# Patient Record
Sex: FEMALE | Race: WHITE | NOT HISPANIC OR LATINO | Employment: FULL TIME | URBAN - METROPOLITAN AREA
[De-identification: names, ages, dates, MRNs, and addresses within clinical notes are randomized per-mention and may not be internally consistent; named-entity substitution may affect disease eponyms.]

---

## 2018-07-03 ENCOUNTER — TELEPHONE (OUTPATIENT)
Dept: GASTROENTEROLOGY | Facility: CLINIC | Age: 53
End: 2018-07-03

## 2018-07-03 NOTE — TELEPHONE ENCOUNTER
----- Message from Kalee Dias sent at 7/3/2018  3:13 PM CDT -----  Contact: Self   Pt is requesting a call back in regards to scheduling an appt for a second opinion for bloating, vomiting and stomach pain. Pt thinks there might be a blockage from radiation treatment and would like to be seen.       Pt can be connate at  [phone]

## 2018-07-06 ENCOUNTER — OFFICE VISIT (OUTPATIENT)
Dept: GASTROENTEROLOGY | Facility: CLINIC | Age: 53
End: 2018-07-06
Payer: COMMERCIAL

## 2018-07-06 VITALS
SYSTOLIC BLOOD PRESSURE: 143 MMHG | HEART RATE: 61 BPM | DIASTOLIC BLOOD PRESSURE: 90 MMHG | HEIGHT: 61 IN | BODY MASS INDEX: 22.6 KG/M2 | WEIGHT: 119.69 LBS

## 2018-07-06 DIAGNOSIS — K58.0 IRRITABLE BOWEL SYNDROME WITH DIARRHEA: ICD-10-CM

## 2018-07-06 DIAGNOSIS — R14.0 BLOATING: Primary | ICD-10-CM

## 2018-07-06 DIAGNOSIS — K56.699 COLONIC STRICTURE: ICD-10-CM

## 2018-07-06 PROCEDURE — 99999 PR PBB SHADOW E&M-EST. PATIENT-LVL III: CPT | Mod: PBBFAC,,, | Performed by: INTERNAL MEDICINE

## 2018-07-06 PROCEDURE — 3008F BODY MASS INDEX DOCD: CPT | Mod: CPTII,S$GLB,, | Performed by: INTERNAL MEDICINE

## 2018-07-06 PROCEDURE — 91065 BREATH HYDROGEN/METHANE TEST: CPT | Mod: S$GLB,,, | Performed by: INTERNAL MEDICINE

## 2018-07-06 PROCEDURE — 99205 OFFICE O/P NEW HI 60 MIN: CPT | Mod: S$GLB,,, | Performed by: INTERNAL MEDICINE

## 2018-07-06 RX ORDER — AMITRIPTYLINE HYDROCHLORIDE 25 MG/1
25 TABLET, FILM COATED ORAL NIGHTLY
Qty: 30 TABLET | Refills: 3 | Status: SHIPPED | OUTPATIENT
Start: 2018-07-06 | End: 2018-11-30 | Stop reason: SDUPTHER

## 2018-07-06 RX ORDER — ESTRADIOL 0.5 MG/1
0.5 TABLET ORAL DAILY
COMMUNITY

## 2018-07-06 NOTE — PROGRESS NOTES
"    [facility] Consultation Note    Reason for Consult:  The primary encounter diagnosis was Bloating. Diagnoses of Irritable bowel syndrome with diarrhea and Colonic stricture were also pertinent to this visit.    PCP:   Yared Proctor   No address on file    Referring MD:  Aaareferral Self  No address on file    Initial HPI:  This is a 53 y.o. female here for evaluation of pain and bloating. When she has an episode it gets worse throughout the day progressively and then leads to watery diarrhea.  Accompanied with palpitations and sweats. Takes her a week to recover and then she goes back to normal,    This started around 2011 and has gradually been getting worse and more progressive. At first 4-5 times a year, now every other week.  Took some over the counter "parashield" for 3 months without improvement. She thought she may have had a parasite      She has seen Dr. Bernheim and Dr. Sheikh for opinions in the past.  She was treated for GERD and took OTC PPI's.  Has also taken carafate without improvement  Tried gluten free diet without improvement    For BM's has tried miralax to help which does soften her stool but she feels like she never completely empties out.      Interval HPI 07/06/2018:      ROS:  Constitutional: No fevers, chills, No weight loss  ENT: No allergies  CV: No chest pain  Pulm: No cough, No shortness of breath  Ophtho: No vision changes  GI: see HPI  Derm: No rash  Heme: No lymphadenopathy, No bruising  MSK: No arthritis  : No dysuria, No hematuria  Endo: No hot or cold intolerance  Neuro: No syncope, No seizure  Psych: + anxiety, No depression    Medical History:  has a past medical history of Cervical cancer (2001).    Surgical History:  has a past surgical history that includes Cholecystectomy (2013) and Hysterectomy.    Family History: family history includes Kidney cancer in her paternal uncle; Prostate cancer in her father..     Social History:  reports that she " "has never smoked. She does not have any smokeless tobacco history on file.    Review of patient's allergies indicates:  No Known Allergies    Current Outpatient Prescriptions   Medication Sig Dispense Refill    estradiol (ESTRACE) 0.5 MG tablet Take 0.5 mg by mouth once daily.      ranitidine (ZANTAC) 150 MG capsule Take 150 mg by mouth 2 (two) times daily.      amitriptyline (ELAVIL) 25 MG tablet Take 1 tablet (25 mg total) by mouth every evening. Take 1/2 tablet each night first week, then increase to 1 tablet each night 30 tablet 3     No current facility-administered medications for this visit.            Objective Findings:    Vital Signs:  BP (!) 143/90   Pulse 61   Ht 5' 1" (1.549 m)   Wt 54.3 kg (119 lb 11.4 oz)   BMI 22.62 kg/m²   Body mass index is 22.62 kg/m².    Physical Exam:  General Appearance: Well appearing in no acute distress  Head:   Normocephalic, without obvious abnormality  Eyes:    No scleral icterus, EOMI  ENT: Neck supple, Lips, mucosa, and tongue normal; teeth and gums normal  Lungs: CTA bilaterally in anterior and posterior fields, no wheezes, no crackles.  Heart:  Regular rate and rhythm, S1, S2 normal, no murmurs heard  Abdomen: Soft, non tender, non distended with positive bowel sounds in all four quadrants. No hepatosplenomegaly, ascites, or mass  Extremities: 2+ pulses, no clubbing, cyanosis or edema  Skin: No rash  Neurologic: CN II-XII intact      Labs:  H pylori - negative  CMP,CBC, TSH, CRP - wnl        Imaging:  CT 4/18 - wnl  9/13 - wnl  MRI/MRCP wnl    Endoscopy:    EGD/Colon 2013 - some fixation at sigmoid colon at 15 cm, changed to upper scope to get through      Assessment:  1. Bloating    2. Irritable bowel syndrome with diarrhea    3. Colonic stricture       I suspect she has had some intestinal damage from chemoradiation leading to bacterial overgrowth leading to bloating and diarrhea and an IBS type picture.    Recommendations:  1. Start elavil  2. Lactulose " breath test  3. Start intestinal healing supplements - L glutamine and zinc carnosine    No Follow-up on file.      Order summary:  Orders Placed This Encounter    amitriptyline (ELAVIL) 25 MG tablet         Thank you so much for allowing me to participate in the care of Angela Cooksey Arnab Ray, MD    I have reviewed a vast amount of outside data and records prior to the patient visit. The medical decision making was complex as this was a third opinion.  I spent 40  minutes with the patient.

## 2018-07-06 NOTE — PATIENT INSTRUCTIONS
L-Glutamine  - 5 grams to 15 grams daily:   - Nutricology, Perm A Sangita Powder:  1 tbsp in AM, 1 tbsp in PM  - http://[facility].com/prodcat/digestive-aids-and-healing    Zinc Carnosine - 75 mg twice a day

## 2018-07-16 ENCOUNTER — TELEPHONE (OUTPATIENT)
Dept: GASTROENTEROLOGY | Facility: CLINIC | Age: 53
End: 2018-07-16

## 2018-07-16 NOTE — TELEPHONE ENCOUNTER
----- Message from Jazmyne Infante MA sent at 7/16/2018  3:03 PM CDT -----  Contact: [phone]  Needs Advice    Reason for call:    Pt has not received her breath test by mail    Communication Preference: [phone]    Additional Information:please mail to:  [id number]  [place] [address]

## 2018-07-23 ENCOUNTER — TELEPHONE (OUTPATIENT)
Dept: GASTROENTEROLOGY | Facility: CLINIC | Age: 53
End: 2018-07-23

## 2018-07-23 NOTE — TELEPHONE ENCOUNTER
----- Message from Mary Cramer sent at 7/23/2018 10:30 AM CDT -----  Contact: Self- [phone]  [place]- pt called to speak with staff- states she still hasn't received the breathe test via mail- please contact pt at [phone]

## 2018-08-11 ENCOUNTER — TELEPHONE (OUTPATIENT)
Dept: GASTROENTEROLOGY | Facility: CLINIC | Age: 53
End: 2018-08-11

## 2018-08-13 ENCOUNTER — TELEPHONE (OUTPATIENT)
Dept: GASTROENTEROLOGY | Facility: CLINIC | Age: 53
End: 2018-08-13

## 2018-08-13 NOTE — TELEPHONE ENCOUNTER
Dr. Alcantara,  I called patient to release test results and she states she had another episode and she states she has lymphedema in left leg and wants to know if that's the cause , she states when she vomits its a lot of fluid and wants to know if she needs to see you sooner  Please advise       Test results given pt verbalized understanding appt scheduled for 10/5

## 2018-08-13 NOTE — TELEPHONE ENCOUNTER
----- Message from Claudia Saldaña MA sent at 8/13/2018  2:54 PM CDT -----  Contact: self  [phone]  Patient Returning Call from [facility]    Who Left Message for Patient:  maribel  Communication Preference:  Phone# above  Aditional Information:  na

## 2018-10-30 ENCOUNTER — OFFICE VISIT (OUTPATIENT)
Dept: GASTROENTEROLOGY | Facility: CLINIC | Age: 53
End: 2018-10-30
Payer: COMMERCIAL

## 2018-10-30 VITALS
HEIGHT: 61 IN | SYSTOLIC BLOOD PRESSURE: 143 MMHG | WEIGHT: 126.75 LBS | BODY MASS INDEX: 23.93 KG/M2 | HEART RATE: 89 BPM | DIASTOLIC BLOOD PRESSURE: 92 MMHG

## 2018-10-30 DIAGNOSIS — K58.0 IRRITABLE BOWEL SYNDROME WITH DIARRHEA: ICD-10-CM

## 2018-10-30 DIAGNOSIS — R14.0 BLOATING: ICD-10-CM

## 2018-10-30 DIAGNOSIS — D47.09 MASTOCYTOSIS: Primary | ICD-10-CM

## 2018-10-30 PROCEDURE — 3008F BODY MASS INDEX DOCD: CPT | Mod: S$GLB,,, | Performed by: INTERNAL MEDICINE

## 2018-10-30 PROCEDURE — 99214 OFFICE O/P EST MOD 30 MIN: CPT | Mod: S$GLB,,, | Performed by: INTERNAL MEDICINE

## 2018-10-30 PROCEDURE — 99999 PR PBB SHADOW E&M-EST. PATIENT-LVL III: CPT | Mod: PBBFAC,,, | Performed by: INTERNAL MEDICINE

## 2018-10-30 NOTE — PATIENT INSTRUCTIONS
Start enteragam special powder from [facility] pharmacy twice a day    Add Florastor probiotic 250 mg twice a day    [facility]    Fax [phone]    Website: http://[employer]/hub/    The recommended starting dose of ENTERAGAM is based  on the number of loose or watery stools experienced per day.  # of loose/watery stools per day   # of packets per day  5 or fewer: 1 to 2 packets  6-10: 2 packets  11-15: 3 packets  16+: 4 packets

## 2018-10-30 NOTE — PROGRESS NOTES
"    [facility] Consultation Note    Reason for Consult:  The primary encounter diagnosis was Mastocytosis. Diagnoses of Irritable bowel syndrome with diarrhea and Bloating were also pertinent to this visit.    PCP:   Yared Proctor       Referring MD:  No referring provider defined for this encounter.    Initial HPI:  This is a 53 y.o. female here for evaluation of pain and bloating. When she has an episode it gets worse throughout the day progressively and then leads to watery diarrhea.  Accompanied with palpitations and sweats. Takes her a week to recover and then she goes back to normal,    This started around 2011 and has gradually been getting worse and more progressive. At first 4-5 times a year, now every other week.  Took some over the counter "parashield" for 3 months without improvement. She thought she may have had a parasite      She has seen Dr. Bernheim and Dr. Sheikh for opinions in the past.  She was treated for GERD and took OTC PPI's.  Has also taken carafate without improvement  Tried gluten free diet without improvement    For BM's has tried miralax to help which does soften her stool but she feels like she never completely empties out.      Interval HPI 10/30/2018:  Started l glutamine/ zinc carnosine/ elavil    When she has an episode she bloats and feels cold/chills,  Also has tingling and contractions in her hands and tachycardia  Cokes does help her throw up and feel better faster  Has had 4 of these episodes. Triggered by broccoli, watermelon, and corn. Maybe banana    ROS:  Constitutional: No fevers, chills, No weight loss  ENT: No allergies  CV: No chest pain  Pulm: No cough, No shortness of breath  Ophtho: No vision changes  GI: see HPI  Derm: No rash  Heme: No lymphadenopathy, No bruising  MSK: + lymphedema in legs  : No dysuria, No hematuria  Endo: No hot or cold intolerance  Neuro: No syncope, No seizure  Psych: + anxiety, No depression    Medical History:  " "has a past medical history of Cervical cancer (2001).    Surgical History:  has a past surgical history that includes Cholecystectomy (2013) and Hysterectomy.        Review of patient's allergies indicates:  No Known Allergies    Current Outpatient Medications   Medication Sig Dispense Refill    amitriptyline (ELAVIL) 25 MG tablet Take 1 tablet (25 mg total) by mouth every evening. Take 1/2 tablet each night first week, then increase to 1 tablet each night 30 tablet 3    DIETARY SUPPLEMENT ORAL Take by mouth 2 (two) times daily.      estradiol (ESTRACE) 0.5 MG tablet Take 0.5 mg by mouth once daily.      ranitidine (ZANTAC) 150 MG capsule Take 150 mg by mouth 2 (two) times daily.      immune glob-plasma fra bovine (ENTERAGAM) 5 gram PwPk Take 5 g by mouth 2 (two) times daily. 60 packet 5     No current facility-administered medications for this visit.            Objective Findings:    Vital Signs:  BP (!) 143/92   Pulse 89   Ht 5' 1" (1.549 m)   Wt 57.5 kg (126 lb 12.2 oz)   BMI 23.95 kg/m²   Body mass index is 23.95 kg/m².    Physical Exam:  General Appearance: Well appearing in no acute distress  Head:   Normocephalic, without obvious abnormality  Eyes:    No scleral icterus, EOMI  ENT: Neck supple, Lips, mucosa, and tongue normal; teeth and gums normal  Lungs: CTA bilaterally in anterior and posterior fields, no wheezes, no crackles.  Heart:  Regular rate and rhythm, S1, S2 normal, no murmurs heard  Abdomen: Soft, non tender, non distended with positive bowel sounds in all four quadrants. No hepatosplenomegaly, ascites, or mass  Extremities: 2+ pulses, no clubbing, cyanosis or edema  Skin: No rash  Neurologic: CN II-XII intact      Labs:  H pylori - negative  CMP,CBC, TSH, CRP - wnl    Lactulose breath test - total flatline    Imaging:  CT 4/18 - wnl  9/13 - wnl  MRI/MRCP wnl    Endoscopy:    EGD/Colon 2013 - some fixation at sigmoid colon at 15 cm, changed to upper scope to get " "through      Assessment:  1. Mastocytosis    2. Irritable bowel syndrome with diarrhea    3. Bloating         Breath test negative could mean no SIBO or H2S possibly. She does have a " smell" to BM's sometimes during episodes  These may also be triggered by mast cells    Recommendations:  1. Continue elavil  2. Will add enteragam and florastor for gut healing  3. Continue other healing supplements - L glutamine and zinc carnosine  4. Get EGD for mastocytosis biopsies  5. If she has a flareup please get tryptase, IgE, histamine, and chromogranin A levels. These have been ordered in the Epic system    No Follow-up on file.      Order summary:  Orders Placed This Encounter    Tryptase    IgE    Histamine    Chromogranin A    immune glob-plasma fra bovine (ENTERAGAM) 5 gram PwPk    Case request GI: ESOPHAGOGASTRODUODENOSCOPY (EGD)         Thank you so much for allowing me to participate in the care of Angela Cooksey Arnab Ray, MD            "

## 2018-11-07 ENCOUNTER — TELEPHONE (OUTPATIENT)
Dept: GASTROENTEROLOGY | Facility: CLINIC | Age: 53
End: 2018-11-07

## 2018-11-07 NOTE — TELEPHONE ENCOUNTER
Ma spoke to pt,    Pt stated she has started the medication on yesterday she paid $200 for it just to get started but would like for her insurance to cover it.    Pt stated she did the appeal. So, now Dr. Alcantara has to fill out the paper that was sent by insurance company     Pt stated it would cost her $100 a month if insurance cover this medication.

## 2018-11-08 ENCOUNTER — TELEPHONE (OUTPATIENT)
Dept: GASTROENTEROLOGY | Facility: CLINIC | Age: 53
End: 2018-11-08

## 2018-11-08 NOTE — TELEPHONE ENCOUNTER
Spoke to pt,     She said she didn't sign up. I provided her with the website.     She will give me a call back

## 2018-11-30 RX ORDER — AMITRIPTYLINE HYDROCHLORIDE 25 MG/1
25 TABLET, FILM COATED ORAL NIGHTLY
Qty: 30 TABLET | Refills: 3 | Status: SHIPPED | OUTPATIENT
Start: 2018-11-30 | End: 2019-04-16

## 2018-12-07 ENCOUNTER — ANESTHESIA EVENT (OUTPATIENT)
Dept: ENDOSCOPY | Facility: HOSPITAL | Age: 53
End: 2018-12-07
Payer: COMMERCIAL

## 2018-12-07 ENCOUNTER — ANESTHESIA (OUTPATIENT)
Dept: ENDOSCOPY | Facility: HOSPITAL | Age: 53
End: 2018-12-07
Payer: COMMERCIAL

## 2018-12-07 ENCOUNTER — HOSPITAL ENCOUNTER (OUTPATIENT)
Facility: HOSPITAL | Age: 53
Discharge: HOME | End: 2018-12-07
Attending: INTERNAL MEDICINE | Admitting: INTERNAL MEDICINE
Payer: COMMERCIAL

## 2018-12-07 VITALS
SYSTOLIC BLOOD PRESSURE: 127 MMHG | DIASTOLIC BLOOD PRESSURE: 74 MMHG | TEMPERATURE: 98 F | BODY MASS INDEX: 25.11 KG/M2 | OXYGEN SATURATION: 98 % | HEIGHT: 61 IN | WEIGHT: 133 LBS | HEART RATE: 103 BPM | RESPIRATION RATE: 18 BRPM

## 2018-12-07 DIAGNOSIS — R10.13 EPIGASTRIC ABDOMINAL PAIN: Primary | ICD-10-CM

## 2018-12-07 PROCEDURE — 27201012 HC FORCEPS, HOT/COLD, DISP: Performed by: INTERNAL MEDICINE

## 2018-12-07 PROCEDURE — 88341 IMHCHEM/IMCYTCHM EA ADD ANTB: CPT | Mod: 26,,, | Performed by: PATHOLOGY

## 2018-12-07 PROCEDURE — 43239 EGD BIOPSY SINGLE/MULTIPLE: CPT | Mod: ,,, | Performed by: INTERNAL MEDICINE

## 2018-12-07 PROCEDURE — 37000009 HC ANESTHESIA EA ADD 15 MINS: Performed by: INTERNAL MEDICINE

## 2018-12-07 PROCEDURE — 25000003 PHARM REV CODE 250: Performed by: INTERNAL MEDICINE

## 2018-12-07 PROCEDURE — E9220 PRA ENDO ANESTHESIA: HCPCS | Mod: ,,, | Performed by: NURSE ANESTHETIST, CERTIFIED REGISTERED

## 2018-12-07 PROCEDURE — 43239 EGD BIOPSY SINGLE/MULTIPLE: CPT | Performed by: INTERNAL MEDICINE

## 2018-12-07 PROCEDURE — 37000008 HC ANESTHESIA 1ST 15 MINUTES: Performed by: INTERNAL MEDICINE

## 2018-12-07 PROCEDURE — 63600175 PHARM REV CODE 636 W HCPCS: Performed by: NURSE ANESTHETIST, CERTIFIED REGISTERED

## 2018-12-07 PROCEDURE — 88305 TISSUE EXAM BY PATHOLOGIST: CPT | Performed by: PATHOLOGY

## 2018-12-07 PROCEDURE — 25000003 PHARM REV CODE 250: Performed by: NURSE ANESTHETIST, CERTIFIED REGISTERED

## 2018-12-07 PROCEDURE — 88342 IMHCHEM/IMCYTCHM 1ST ANTB: CPT | Mod: 26,,, | Performed by: PATHOLOGY

## 2018-12-07 RX ORDER — LIDOCAINE HCL/PF 100 MG/5ML
SYRINGE (ML) INTRAVENOUS
Status: DISCONTINUED | OUTPATIENT
Start: 2018-12-07 | End: 2018-12-07

## 2018-12-07 RX ORDER — LISINOPRIL 20 MG/1
20 TABLET ORAL DAILY
COMMUNITY

## 2018-12-07 RX ORDER — GLYCOPYRROLATE 0.2 MG/ML
INJECTION INTRAMUSCULAR; INTRAVENOUS
Status: DISCONTINUED | OUTPATIENT
Start: 2018-12-07 | End: 2018-12-07

## 2018-12-07 RX ORDER — PROPOFOL 10 MG/ML
INJECTION, EMULSION INTRAVENOUS
Status: DISCONTINUED | OUTPATIENT
Start: 2018-12-07 | End: 2018-12-07

## 2018-12-07 RX ORDER — SODIUM CHLORIDE 0.9 % (FLUSH) 0.9 %
3 SYRINGE (ML) INJECTION
Status: DISCONTINUED | OUTPATIENT
Start: 2018-12-07 | End: 2018-12-07 | Stop reason: HOSPADM

## 2018-12-07 RX ORDER — SODIUM CHLORIDE 9 MG/ML
INJECTION, SOLUTION INTRAVENOUS CONTINUOUS
Status: DISCONTINUED | OUTPATIENT
Start: 2018-12-07 | End: 2018-12-07 | Stop reason: HOSPADM

## 2018-12-07 RX ORDER — LEVOTHYROXINE SODIUM 25 UG/1
25 TABLET ORAL DAILY
COMMUNITY

## 2018-12-07 RX ORDER — SIMVASTATIN 20 MG/1
20 TABLET, FILM COATED ORAL NIGHTLY
COMMUNITY

## 2018-12-07 RX ADMIN — GLYCOPYRROLATE 0.2 MG: 0.2 INJECTION, SOLUTION INTRAMUSCULAR; INTRAVENOUS at 11:12

## 2018-12-07 RX ADMIN — PROPOFOL 50 MG: 10 INJECTION, EMULSION INTRAVENOUS at 11:12

## 2018-12-07 RX ADMIN — LIDOCAINE HYDROCHLORIDE 50 MG: 20 INJECTION, SOLUTION INTRAVENOUS at 11:12

## 2018-12-07 RX ADMIN — PROPOFOL 30 MG: 10 INJECTION, EMULSION INTRAVENOUS at 11:12

## 2018-12-07 RX ADMIN — PROPOFOL 100 MG: 10 INJECTION, EMULSION INTRAVENOUS at 11:12

## 2018-12-07 RX ADMIN — PROPOFOL 20 MG: 10 INJECTION, EMULSION INTRAVENOUS at 11:12

## 2018-12-07 RX ADMIN — SODIUM CHLORIDE: 0.9 INJECTION, SOLUTION INTRAVENOUS at 10:12

## 2018-12-07 RX ADMIN — PROPOFOL 40 MG: 10 INJECTION, EMULSION INTRAVENOUS at 11:12

## 2018-12-07 NOTE — DISCHARGE INSTRUCTIONS

## 2018-12-07 NOTE — [employer] PATIENT INSTRUCTIONS
Discharge Summary/Instructions after an Endoscopic Procedure  Patient Name: Angela Cooksey  Patient MRN: [id number]  Patient YOB: 1965 Friday, December 07, 2018  Santiago Alcantara MD  RESTRICTIONS:  During your procedure today, you received medications for sedation.  These   medications may affect your judgment, balance and coordination.  Therefore,   for 24 hours, you have the following restrictions:   - DO NOT drive a car, operate machinery, make legal/financial decisions,   sign important papers or drink alcohol.    ACTIVITY:  Today: no heavy lifting, straining or running due to procedural   sedation/anesthesia.  The following day: return to full activity including work.  DIET:  Eat and drink normally unless instructed otherwise.     TREATMENT FOR COMMON SIDE EFFECTS:  - Mild abdominal pain, nausea, belching, bloating or excessive gas:  rest,   eat lightly and use a heating pad.  - Sore Throat: treat with throat lozenges and/or gargle with warm salt   water.  - Because air was used during the procedure, expelling large amounts of air   from your rectum or belching is normal.  - If a bowel prep was taken, you may not have a bowel movement for 1-3 days.    This is normal.  SYMPTOMS TO WATCH FOR AND REPORT TO YOUR PHYSICIAN:  1. Abdominal pain or bloating, other than gas cramps.  2. Chest pain.  3. Back pain.  4. Signs of infection such as: chills or fever occurring within 24 hours   after the procedure.  5. Rectal bleeding, which would show as bright red, maroon, or black stools.   (A tablespoon of blood from the rectum is not serious, especially if   hemorrhoids are present.)  6. Vomiting.  7. Weakness or dizziness.  GO DIRECTLY TO THE NEAREST EMERGENCY ROOM IF YOU HAVE ANY OF THE FOLLOWING:      Difficulty breathing              Chills and/or fever over 101 F   Persistent vomiting and/or vomiting blood   Severe abdominal pain   Severe chest pain   Black, tarry stools   Bleeding- more than one tablespoon   Any  other symptom or condition that you feel may need urgent attention  Your doctor recommends these additional instructions:  If any biopsies were taken, your doctors clinic will contact you in 1 to 2   weeks with any results.  - Patient has a contact number available for emergencies.  The signs and   symptoms of potential delayed complications were discussed with the   patient.  Return to normal activities tomorrow.  Written discharge   instructions were provided to the patient.   - Discharge patient to home.   - Await pathology results.   - Return to GI clinic at appointment to be scheduled.   - The findings and recommendations were discussed with the designated   responsible adult.  For questions, problems or results please call your physician - Santiago Alcantara MD at Work:  [phone].  [facility], EMERGENCY ROOM PHONE NUMBER: [phone]  IF A COMPLICATION OR EMERGENCY SITUATION ARISES AND YOU ARE UNABLE TO REACH   YOUR PHYSICIAN - GO DIRECTLY TO THE EMERGENCY ROOM.  Santiago Alcantara MD  12/7/2018 11:50:43 AM  This report has been verified and signed electronically.  [employer]

## 2018-12-07 NOTE — TRANSFER OF CARE
"Anesthesia Transfer of Care Note    Patient: Angela Cooksey    Procedure(s) Performed: Procedure(s) (LRB):  ESOPHAGOGASTRODUODENOSCOPY (EGD) (N/A)    Patient location: GI    Anesthesia Type: general    Transport from OR: Transported from OR on room air with adequate spontaneous ventilation    Post pain: adequate analgesia    Post assessment: no apparent anesthetic complications and tolerated procedure well    Post vital signs: stable    Level of consciousness: awake    Nausea/Vomiting: no nausea/vomiting    Complications: none    Transfer of care protocol was followed      Last vitals:   Visit Vitals  BP (!) 172/78 (BP Location: Left arm, Patient Position: Lying)   Pulse 106   Temp 36.5 °C (97.7 °F) (Skin)   Resp 20   Ht 5' 1" (1.549 m)   Wt 60.3 kg (133 lb)   SpO2 99%   Breastfeeding? No   BMI 25.13 kg/m²     "

## 2018-12-07 NOTE — ANESTHESIA POSTPROCEDURE EVALUATION
"Anesthesia Post Evaluation    Patient: Angela Cooksey    Procedure(s) Performed: Procedure(s) (LRB):  ESOPHAGOGASTRODUODENOSCOPY (EGD) (N/A)    Final Anesthesia Type: general  Patient location during evaluation: PACU  Patient participation: Yes- Able to Participate  Level of consciousness: awake and alert and oriented  Post-procedure vital signs: reviewed and stable  Pain management: adequate  Airway patency: patent  PONV status at discharge: No PONV  Anesthetic complications: no      Cardiovascular status: blood pressure returned to baseline  Respiratory status: unassisted, room air and spontaneous ventilation  Hydration status: euvolemic  Follow-up not needed.        Visit Vitals  /74   Pulse 103   Temp 36.7 °C (98.1 °F)   Resp 18   Ht 5' 1" (1.549 m)   Wt 60.3 kg (133 lb)   SpO2 98%   Breastfeeding? No   BMI 25.13 kg/m²       Pain/Yessenia Score: Presence of Pain: denies (12/7/2018 12:13 PM)  Yessenia Score: 9 (12/7/2018 12:24 PM)        "

## 2018-12-07 NOTE — ANESTHESIA PREPROCEDURE EVALUATION
12/07/2018  Angela Cooksey is a 53 y.o., female.    Anesthesia Evaluation    I have reviewed the Patient Summary Reports.    I have reviewed the Nursing Notes.   I have reviewed the Medications.     Review of Systems  Anesthesia Hx:  No problems with previous Anesthesia  Personal Hx of Anesthesia complications, Post-Operative Nausea/Vomiting, in the past, but not with recent anesthetics / prophylaxis   Social:  Non-Smoker    Hematology/Oncology:  Hematology Normal       -- Cancer in past history: s/p surgery, s/p chemotherapy and s/p radiation therapy    Oncology: (2001)  Oncology Comments: Cervical cancer      EENT/Dental:EENT/Dental Normal   Cardiovascular:   Exercise tolerance: good Hypertension, well controlled    Pulmonary:  Pulmonary Normal    Renal/:  Renal/ Normal     Hepatic/GI:   GERD    Musculoskeletal:  Musculoskeletal Normal    Neurological:  Neurology Normal    Endocrine:   Hypothyroidism    Dermatological:  Skin Normal    Psych:  Psychiatric Normal           Physical Exam  General:  Well nourished    Airway/Jaw/Neck:  Airway Findings: Mouth Opening: Normal Mallampati: II  Improves to I with phonation.  TM Distance: Normal, at least 6 cm  Jaw/Neck Findings:  Neck ROM: Normal ROM      Dental:  Dental Findings: In tact   Chest/Lungs:  Chest/Lungs Findings: Clear to auscultation, Normal Respiratory Rate     Heart/Vascular:  Heart Findings: Rate: Normal  Rhythm: Regular Rhythm  Sounds: Normal     Abdomen:  Abdomen Findings:  Normal, Soft, Nontender       Mental Status:  Mental Status Findings:  Alert and Oriented, Cooperative         Anesthesia Plan  Type of Anesthesia, risks & benefits discussed:  Anesthesia Type:  general  Patient's Preference:   Intra-op Monitoring Plan: standard ASA monitors  Intra-op Monitoring Plan Comments:   Post Op Pain Control Plan: IV/PO Opioids PRN  Post Op Pain  Control Plan Comments:   Induction:   IV  Beta Blocker:  Patient is not currently on a Beta-Blocker (No further documentation required).       Informed Consent: Patient understands risks and agrees with Anesthesia plan.  Questions answered. Anesthesia consent signed with patient.  ASA Score: 2     Day of Surgery Review of History & Physical: I have interviewed and examined the patient. I have reviewed the patient's H&P dated: 12/07/18. There are no significant changes.          Ready For Surgery From Anesthesia Perspective.

## 2018-12-07 NOTE — H&P
Short Stay Endoscopy History and Physical    PCP - Yared Proctor MD  Referring Physician - Santiago Alcantara MD  [address] [place] [address]    Procedure - EGD  ASA - per anesthesia  Mallampati - per anesthesia  History of Anesthesia problems - see anesthesia note  Family history Anesthesia problems -  see anesthesia note  Plan of anesthesia - as per anesthesia    HPI  53 y.o. female    Reason for procedure: abdominal discomfort, nausea, diarrhea    Abdominal/epigastric pain: yes  Reflux: No   Dysphagia: No  Change in bowel habits: yes      ROS:  Constitutional: No fevers, chills  CV: No chest pain  Pulm: No shortness of breath  GI: see HPI    Medical History:  has a past medical history of Cervical cancer (2001).    Surgical History:  has a past surgical history that includes Cholecystectomy (2013) and Hysterectomy.    Family History: family history includes Kidney cancer in her paternal uncle; Prostate cancer in her father.    Social History:  reports that  has never smoked. She does not have any smokeless tobacco history on file. She reports that she drinks alcohol. She reports that she does not use drugs.    Review of patient's allergies indicates:  No Known Allergies    Medications:   Medications Prior to Admission   Medication Sig Dispense Refill Last Dose    amitriptyline (ELAVIL) 25 MG tablet Take 1 tablet (25 mg total) by mouth every evening. 30 tablet 3 12/6/2018 at Unknown time    estradiol (ESTRACE) 0.5 MG tablet Take 0.5 mg by mouth once daily.   12/6/2018 at Unknown time    immune glob-plasma fra bovine (ENTERAGAM) 5 gram PwPk Take 5 g by mouth 2 (two) times daily. 60 packet 5 12/6/2018 at Unknown time    levothyroxine (SYNTHROID) 25 MCG tablet Take 25 mcg by mouth once daily.   12/7/2018 at Unknown time    lisinopril (PRINIVIL,ZESTRIL) 20 MG tablet Take 20 mg by mouth once daily.   12/6/2018 at Unknown time    ranitidine (ZANTAC) 150 MG capsule Take 150 mg by mouth 2 (two) times daily.    Past Month at Unknown time    simvastatin (ZOCOR) 20 MG tablet Take 20 mg by mouth every evening.   12/6/2018 at Unknown time    DIETARY SUPPLEMENT ORAL Take by mouth 2 (two) times daily.   Taking       Physical Exam:    Vital Signs:   Vitals:    12/07/18 1041   BP: (!) 172/78   Pulse: 106   Resp: 20   Temp: 97.7 °F (36.5 °C)       General Appearance: Well appearing in no acute distress  Lungs: No respiratory distress.   Heart:  Regular rate, S1, S2 normal.  Abdomen: Soft, non tender, non distended with normal bowel sounds, no masses    Labs:  No results found for: WBC, HGB, HCT, MCV, PLT  No results found for: INR  No results found for: IRON, FERRITIN, TIBC, FESATURATED  No results found for: NA, K, CL, CO2, BUN, CREATININE, GLUF    I have explained the risks and benefits of this endoscopic procedure to the patient/family including but not limited to bleeding, inflammation, infection, perforation, hypoxia/respiratory failure, and death.       Jake Avalos MD  Gastroenterology & Hepatology Fellow  Pager: [phone]

## 2018-12-14 ENCOUNTER — TELEPHONE (OUTPATIENT)
Dept: ENDOSCOPY | Facility: HOSPITAL | Age: 53
End: 2018-12-14

## 2018-12-27 ENCOUNTER — TELEPHONE (OUTPATIENT)
Dept: GASTROENTEROLOGY | Facility: CLINIC | Age: 53
End: 2018-12-27

## 2018-12-27 RX ORDER — CETIRIZINE HYDROCHLORIDE 10 MG/1
10 TABLET ORAL 2 TIMES DAILY
Qty: 60 TABLET | Refills: 11 | Status: SHIPPED | OUTPATIENT
Start: 2018-12-27

## 2018-12-27 NOTE — TELEPHONE ENCOUNTER
Ma spoke to pt informed pt per Dr. Alcantara message below:    please let her know her biopsies did show some  increased mast cells in her small intestine which can release histamine and cause gastric problems. Tell her to continue her zantac and I have sent a prescription for zyrtec twice a day in addition and schedule 4 month follow up.    Pt verbalize understanding, confirmed appt scheduled on 4/15/19 and thank Ma

## 2018-12-27 NOTE — TELEPHONE ENCOUNTER
Biopsy results released to patient in [facility]    Add zyrtec to zantac  Clinic followup to be arranged

## 2019-03-06 ENCOUNTER — TELEPHONE (OUTPATIENT)
Dept: GASTROENTEROLOGY | Facility: CLINIC | Age: 54
End: 2019-03-06

## 2019-03-06 NOTE — TELEPHONE ENCOUNTER
Ma spoke to Cary gabriel he was informed per Dr. Alcantara patient  has a special condition requiring it twice a day    Shan verbalize understanding and thank Ma     ----- Message from Santiago Alcantara MD sent at 3/6/2019  7:56 AM CST -----  Contact: [facility] - [phone]  She has a special condition requiring it twice a day  ----- Message -----  From: Nichol Garcia MA  Sent: 3/4/2019   4:21 PM  To: Santiago Alcantara MD    Take 1 tablet (10 mg total) by mouth 2 (two) times daily.    [facility] what's to know if that's correct? Because it's normally once daily    ----- Message -----  From: Tyesha Watts  Sent: 3/4/2019   4:13 PM  To: Quinton Alcantara    Needs Advice    Reason for call: needs clarification on the generic zyrtec 10mg        Communication Preference: [facility] - [phone]    Additional Information:

## 2019-04-15 NOTE — PROGRESS NOTES
"    [facility] Consultation Note    Reason for Consult:  The primary encounter diagnosis was Mastocytosis. A diagnosis of Bloating was also pertinent to this visit.    PCP:   Yared Proctor       Referring MD:  No referring provider defined for this encounter.    Initial HPI:  This is a 54 y.o. female here for evaluation of pain and bloating. When she has an episode it gets worse throughout the day progressively and then leads to watery diarrhea.  Accompanied with palpitations and sweats. Takes her a week to recover and then she goes back to normal,    This started around 2011 and has gradually been getting worse and more progressive. At first 4-5 times a year, now every other week.  Took some over the counter "parashield" - grapefruit seed, ilan d arco, nigella sativa, garlic, wormwood  for 3 months without improvement. She thought she may have had a parasite      She has seen Dr. Bernheim and Dr. Shiekh for opinions in the past.  She was treated for GERD and took OTC PPI's.  Has also taken carafate without improvement  Tried gluten free diet without improvement    For BM's has tried miralax to help which does soften her stool but she feels like she never completely empties out.      Interval HPI 04/16/2019:  Started L glutamine/ zinc carnosine - got more constipated/ elavil is taking.  Last visit recommended starting enteragam and florastor. She is not taking the florastor. She felt like the enteragam was making her bloated so she cut back.  Instead she is taking parashield 2 bid and she feels that is helping more than anything.      ROS:  Constitutional: No fevers, chills, + 15 weight gain  ENT: No allergies  CV: No chest pain  Pulm: No cough, No shortness of breath  Ophtho: No vision changes  GI: see HPI  Derm: No rash  Heme: No lymphadenopathy, No bruising  MSK: + lymphedema in legs  : No dysuria, No hematuria  Endo: No hot or cold intolerance  Neuro: No syncope, No seizure  Psych: + " "anxiety, No depression    Medical History:  has a past medical history of Cervical cancer (2001).    Surgical History:  has a past surgical history that includes Cholecystectomy (2013); Hysterectomy; Esophagogastroduodenoscopy (N/A, 12/7/2018); Colonoscopy; and Upper gastrointestinal endoscopy.      Review of patient's allergies indicates:  No Known Allergies    Current Outpatient Medications   Medication Sig Dispense Refill    amitriptyline (ELAVIL) 25 MG tablet Take 2 tablets (50 mg total) by mouth every evening. 60 tablet 3    cetirizine (ZYRTEC) 10 MG tablet Take 1 tablet (10 mg total) by mouth 2 (two) times daily. 60 tablet 11    estradiol (ESTRACE) 0.5 MG tablet Take 0.5 mg by mouth once daily.      levothyroxine (SYNTHROID) 25 MCG tablet Take 25 mcg by mouth once daily.      ranitidine (ZANTAC) 150 MG capsule Take 150 mg by mouth 2 (two) times daily.      simvastatin (ZOCOR) 20 MG tablet Take 20 mg by mouth every evening.      UNABLE TO FIND ParaShield: taking 2 tab twice a day. Sometimes takes 2 tab once a day.      immune glob-plasma fra bovine (ENTERAGAM) 5 gram PwPk Take 5 g by mouth 2 (two) times daily. 60 packet 5    lisinopril (PRINIVIL,ZESTRIL) 20 MG tablet Take 20 mg by mouth once daily.       No current facility-administered medications for this visit.          Objective Findings:    Vital Signs:  BP (!) 154/91   Pulse 79   Ht 5' 1.5" (1.562 m)   Wt 61.1 kg (134 lb 11.2 oz)   BMI 25.04 kg/m²   Body mass index is 25.04 kg/m².    Physical Exam:  General Appearance: Well appearing in no acute distress  Head:   Normocephalic, without obvious abnormality  Eyes:    No scleral icterus, EOMI  ENT: Neck supple, Lips, mucosa, and tongue normal; teeth and gums normal  Lungs: CTA bilaterally in anterior and posterior fields, no wheezes, no crackles.  Heart:  Regular rate and rhythm, S1, S2 normal, no murmurs heard  Abdomen: Soft, non tender, non distended with positive bowel sounds in all four " "quadrants. No hepatosplenomegaly, ascites, or mass  Extremities: 2+ pulses, no clubbing, cyanosis or edema  Skin: No rash  Neurologic: CN II-XII intact      Labs:  H pylori - negative  CMP,CBC, TSH, CRP - wnl    Lactulose breath test - total flatline    Imaging:  CT 4/18 - wnl  9/13 - wnl  MRI/MRCP wnl    Endoscopy:    EGD/Colon 2013 - some fixation at sigmoid colon at 15 cm, changed to upper scope to get through  EGD 12/18 - 63 mast cells/hpf duodenum.  hpylori negative      Assessment:  1. Mastocytosis    2. Bloating         Breath test negative could mean no SIBO or H2S possibly. She does have a " smell" to BM's sometimes during episodes  These may also be triggered by mast cells    Recommendations:  1. Continue elavil increase to 50 as tolerated  2. Can add quercetin for mast cell stabilization  3. Continue other healing supplements - L glutamine and zinc carnosine as tolerated  4. If she has a flareup please get tryptase, IgE, histamine, and chromogranin A levels. These have been ordered in the Epic system    Follow up in about 3 months (around 7/16/2019).      Order summary:  Orders Placed This Encounter    amitriptyline (ELAVIL) 25 MG tablet         Thank you so much for allowing me to participate in the care of Angela Cooksey Arnab Ray, MD            "

## 2019-04-16 ENCOUNTER — OFFICE VISIT (OUTPATIENT)
Dept: GASTROENTEROLOGY | Facility: CLINIC | Age: 54
End: 2019-04-16
Payer: COMMERCIAL

## 2019-04-16 VITALS
HEIGHT: 62 IN | WEIGHT: 134.69 LBS | SYSTOLIC BLOOD PRESSURE: 154 MMHG | BODY MASS INDEX: 24.78 KG/M2 | DIASTOLIC BLOOD PRESSURE: 91 MMHG | HEART RATE: 79 BPM

## 2019-04-16 DIAGNOSIS — R14.0 BLOATING: ICD-10-CM

## 2019-04-16 DIAGNOSIS — D47.09 MASTOCYTOSIS: Primary | ICD-10-CM

## 2019-04-16 PROCEDURE — 99214 OFFICE O/P EST MOD 30 MIN: CPT | Mod: S$GLB,,, | Performed by: INTERNAL MEDICINE

## 2019-04-16 PROCEDURE — 3008F PR BODY MASS INDEX (BMI) DOCUMENTED: ICD-10-PCS | Mod: S$GLB,,, | Performed by: INTERNAL MEDICINE

## 2019-04-16 PROCEDURE — 99214 PR OFFICE/OUTPT VISIT, EST, LEVL IV, 30-39 MIN: ICD-10-PCS | Mod: S$GLB,,, | Performed by: INTERNAL MEDICINE

## 2019-04-16 PROCEDURE — 3008F BODY MASS INDEX DOCD: CPT | Mod: S$GLB,,, | Performed by: INTERNAL MEDICINE

## 2019-04-16 PROCEDURE — 99999 PR PBB SHADOW E&M-EST. PATIENT-LVL III: ICD-10-PCS | Mod: PBBFAC,,, | Performed by: INTERNAL MEDICINE

## 2019-04-16 PROCEDURE — 99999 PR PBB SHADOW E&M-EST. PATIENT-LVL III: CPT | Mod: PBBFAC,,, | Performed by: INTERNAL MEDICINE

## 2019-04-16 RX ORDER — AMITRIPTYLINE HYDROCHLORIDE 25 MG/1
50 TABLET, FILM COATED ORAL NIGHTLY
Qty: 60 TABLET | Refills: 3 | Status: SHIPPED | OUTPATIENT
Start: 2019-04-16 | End: 2019-08-23 | Stop reason: SDUPTHER

## 2019-04-16 NOTE — PATIENT INSTRUCTIONS
[email or web address] - Vivian Solis dietitian    Quercetin 500 mg  Vitamin C 1400 mg 2 times a day   $18 for 50    Or Activated Quercetin - 3 capsules (1000mg) once to twice a day  $40 for 200    NFH Quercetin and Bromelain 500 mg  $48 for 60    High dose 2000 mg three times a day  Maintenance dose 1000-2000mg a day    Histamine-Releasing Foods:    Alcohol  Bananas  Chocolate  Cows Milk  Nuts  Papaya  Pineapple  Shellfish  Strawberries  Tomatoes  Wheat Germ  Many artificial preservatives and dyes  KARRI-Blocking Foods:    Alcohol  Energy drinks  Black tea  Mate tea  Green tea    Whew! That was a long list. You might be wondering now what on earth you CAN eat, so I've made a list of low histamine foods as well. Remember that freshness is key when you have histamine intolerance!    Here's a list of low-histamine foods:    freshly cooked meat, poultry (frozen or fresh)  freshly caught fish  eggs  gluten-free grains: rice, quinoa  pure peanut butter  fresh fruits: vaughn, pear, watermelon, apple, kiwi, cantaloupe, grapes  fresh vegetables (except tomatoes, spinach, avocado, and eggplant)  dairy substitutes: coconut milk, rice milk, hemp milk, almond milk  cooking oils: olive oil, coconut oil  leafy herbs  herbal teas

## 2019-05-14 ENCOUNTER — TELEPHONE (OUTPATIENT)
Dept: GASTROENTEROLOGY | Facility: CLINIC | Age: 54
End: 2019-05-14

## 2019-05-14 NOTE — TELEPHONE ENCOUNTER
Ma spoke to pt r/s pt from 7/10 to 7/30     Pt verbalize understanding, confirmed appt on 7/30 at 1 pm and thank Ma       ----- Message from Tyesha Watts sent at 5/14/2019  9:08 AM CDT -----  Contact: self  [phone]  Ray    Needs Advice    Reason for call: returning your call         Communication Preference:[phone]    Additional Information:

## 2019-07-19 ENCOUNTER — TELEPHONE (OUTPATIENT)
Dept: GASTROENTEROLOGY | Facility: CLINIC | Age: 54
End: 2019-07-19

## 2019-08-23 RX ORDER — AMITRIPTYLINE HYDROCHLORIDE 25 MG/1
50 TABLET, FILM COATED ORAL NIGHTLY
Qty: 60 TABLET | Refills: 3 | Status: SHIPPED | OUTPATIENT
Start: 2019-08-23

## 2019-09-18 ENCOUNTER — TELEPHONE (OUTPATIENT)
Dept: GASTROENTEROLOGY | Facility: CLINIC | Age: 54
End: 2019-09-18

## 2019-09-18 NOTE — TELEPHONE ENCOUNTER
Ma spoke to pt informed pt Dr. Alcantara next available apt will be in Jan. Create recall letter to reminder pt to schedule apt     Pt verbalize understanding and thank Ma